# Patient Record
Sex: FEMALE | Race: OTHER
[De-identification: names, ages, dates, MRNs, and addresses within clinical notes are randomized per-mention and may not be internally consistent; named-entity substitution may affect disease eponyms.]

---

## 2019-08-10 ENCOUNTER — HOSPITAL ENCOUNTER (EMERGENCY)
Dept: HOSPITAL 62 - ER | Age: 29
Discharge: HOME | End: 2019-08-10
Payer: COMMERCIAL

## 2019-08-10 VITALS — DIASTOLIC BLOOD PRESSURE: 64 MMHG | SYSTOLIC BLOOD PRESSURE: 111 MMHG

## 2019-08-10 DIAGNOSIS — O46.91: Primary | ICD-10-CM

## 2019-08-10 DIAGNOSIS — Z3A.00: ICD-10-CM

## 2019-08-10 DIAGNOSIS — O26.891: ICD-10-CM

## 2019-08-10 DIAGNOSIS — R10.9: ICD-10-CM

## 2019-08-10 DIAGNOSIS — R10.30: ICD-10-CM

## 2019-08-10 LAB
ADD MANUAL DIFF: NO
ALBUMIN SERPL-MCNC: 3.9 G/DL (ref 3.5–5)
ALP SERPL-CCNC: 71 U/L (ref 38–126)
ANION GAP SERPL CALC-SCNC: 12 MMOL/L (ref 5–19)
APPEARANCE UR: (no result)
APTT PPP: YELLOW S
AST SERPL-CCNC: 25 U/L (ref 14–36)
BASOPHILS # BLD AUTO: 0 10^3/UL (ref 0–0.2)
BASOPHILS NFR BLD AUTO: 0.2 % (ref 0–2)
BILIRUB DIRECT SERPL-MCNC: 0.2 MG/DL (ref 0–0.4)
BILIRUB SERPL-MCNC: 0.2 MG/DL (ref 0.2–1.3)
BILIRUB UR QL STRIP: NEGATIVE
BUN SERPL-MCNC: 12 MG/DL (ref 7–20)
CALCIUM: 9.2 MG/DL (ref 8.4–10.2)
CHLORIDE SERPL-SCNC: 100 MMOL/L (ref 98–107)
CO2 SERPL-SCNC: 26 MMOL/L (ref 22–30)
EOSINOPHIL # BLD AUTO: 0.1 10^3/UL (ref 0–0.6)
EOSINOPHIL NFR BLD AUTO: 0.6 % (ref 0–6)
ERYTHROCYTE [DISTWIDTH] IN BLOOD BY AUTOMATED COUNT: 13.3 % (ref 11.5–14)
GLUCOSE SERPL-MCNC: 103 MG/DL (ref 75–110)
GLUCOSE UR STRIP-MCNC: NEGATIVE MG/DL
HCT VFR BLD CALC: 39.9 % (ref 36–47)
HGB BLD-MCNC: 13.6 G/DL (ref 12–15.5)
KETONES UR STRIP-MCNC: NEGATIVE MG/DL
LYMPHOCYTES # BLD AUTO: 1.9 10^3/UL (ref 0.5–4.7)
LYMPHOCYTES NFR BLD AUTO: 15.1 % (ref 13–45)
MCH RBC QN AUTO: 29.3 PG (ref 27–33.4)
MCHC RBC AUTO-ENTMCNC: 34 G/DL (ref 32–36)
MCV RBC AUTO: 86 FL (ref 80–97)
MONOCYTES # BLD AUTO: 0.6 10^3/UL (ref 0.1–1.4)
MONOCYTES NFR BLD AUTO: 5.2 % (ref 3–13)
NEUTROPHILS # BLD AUTO: 9.7 10^3/UL (ref 1.7–8.2)
NEUTS SEG NFR BLD AUTO: 78.9 % (ref 42–78)
NITRITE UR QL STRIP: NEGATIVE
PH UR STRIP: 6 [PH] (ref 5–9)
PLATELET # BLD: 293 10^3/UL (ref 150–450)
POTASSIUM SERPL-SCNC: 3.8 MMOL/L (ref 3.6–5)
PROT SERPL-MCNC: 6.7 G/DL (ref 6.3–8.2)
PROT UR STRIP-MCNC: NEGATIVE MG/DL
RBC # BLD AUTO: 4.63 10^6/UL (ref 3.72–5.28)
SP GR UR STRIP: 1.02
TOTAL CELLS COUNTED % (AUTO): 100 %
UROBILINOGEN UR-MCNC: 2 MG/DL (ref ?–2)
WBC # BLD AUTO: 12.2 10^3/UL (ref 4–10.5)

## 2019-08-10 PROCEDURE — 76817 TRANSVAGINAL US OBSTETRIC: CPT

## 2019-08-10 PROCEDURE — 84702 CHORIONIC GONADOTROPIN TEST: CPT

## 2019-08-10 PROCEDURE — 80053 COMPREHEN METABOLIC PANEL: CPT

## 2019-08-10 PROCEDURE — 85025 COMPLETE CBC W/AUTO DIFF WBC: CPT

## 2019-08-10 PROCEDURE — 81001 URINALYSIS AUTO W/SCOPE: CPT

## 2019-08-10 PROCEDURE — 36415 COLL VENOUS BLD VENIPUNCTURE: CPT

## 2019-08-10 PROCEDURE — 99284 EMERGENCY DEPT VISIT MOD MDM: CPT

## 2019-08-10 PROCEDURE — 93976 VASCULAR STUDY: CPT

## 2019-08-10 NOTE — ER DOCUMENT REPORT
ED General





- General


Chief Complaint: Vag Bleeding, +preg <12wks


Stated Complaint: ABDOMINAL PAIN


Primary Care Provider: 


JUD SMITH MD [Primary Care Provider] - Follow up as needed


ELIZABETH BO MD [LOC[facility] T[facility]ENS] - 08/12/19 9:30 am


Notes: 





Healthy 29-year-old G1, P0 pregnant female presents to the emergency department 

with chief complaint of vaginal bleeding.  Patient states she was visiting 

family is very when she went to the bathroom and started having some lower 

abdominal cramping and vaginal bleeding.  She noticed a little bit of blood in 

the toilet and then some blood when she wiped.  She states the cramps were 

comparable to menstrual cramps and they are intermittent.  Patient does not know

if she is currently bleeding at this time as she came directly over.  Patient 

denies the abdominal pain is unilateral in nature and says she will 

intermittently feel pain on either side of her lower abdomen.  No fevers or 

chills, no urinary symptoms, no abnormal vaginal discharge, no 

nausea/vomiting/diarrhea/constipation.


TRAVEL OUTSIDE OF THE [place] IN LAST 30 DAYS: No





- Related Data


Allergies/Adverse Reactions: 


                                        





No Known Allergies Allergy (Unverified 08/10/19 17:55)


   











Past Medical History





- Social History


Smoking Status: Never Smoker


Family History: None





Review of Systems





- Review of Systems


Constitutional: See HPI


EENT: No symptoms reported


Cardiovascular: No symptoms reported


Respiratory: No symptoms reported


Gastrointestinal: See HPI


Genitourinary: See HPI


Female Genitourinary: See HPI


Musculoskeletal: No symptoms reported


Skin: No symptoms reported


Hematologic/Lymphatic: No symptoms reported


Neurological/Psychological: No symptoms reported





Physical Exam





- Vital signs


Vitals: 


                                        











Temp Pulse Resp BP Pulse Ox


 


 98.5 F   88   21 H  124/70   100 


 


 08/10/19 17:40  08/10/19 17:40  08/10/19 17:40  08/10/19 17:40  08/10/19 17:40














- Notes


Notes: 





PHYSICAL EXAMINATION:





Reviewed vital signs and charting by RN





GENERAL: Alert, interacts well. No acute distress.


HEAD: Normocephalic, atraumatic.


EYES: Pupils equal and round. Extraocular movements intact.


ENT: Oral mucosa moist, tongue midline. 


NECK: Full range of motion. Trachea midline.


LUNGS: Clear to auscultation bilaterally, no wheezes, rales, or rhonchi. No 

respiratory distress.


HEART: Regular rate and rhythm. No murmur


ABDOMEN: soft, mild lower abdominal tenderness to palpation bilateral adnexal 

areas.  No distention. Bowel sounds present


EXTREMITIES: Moves all 4 extremities spontaneously. No edema,  No cyanosis.


PSYCH: Normal affect, normal mood.


SKIN: Warm, dry, normal turgor. No rashes or lesions noted.








Course





- Re-evaluation


Re-evalutation: 





08/10/19 17:54


Overall well-appearing.  Plan is to get blood work, serum hCG, transvaginal 

ultrasound.


08/10/19 17:54





08/10/19 19:07





                                        





Transvaginal US  08/10/19 17:51


IMPRESSION:  Single intrauterine gestation at sonographic gestational age of 6 

weeks, 6 days.  ABDON 3/29/2020.  Fetal heart rate 122 BPM.


Trimester of pregnancy: First trimester - 0 to 13 weeks.


 











08/10/19 19:58


Beta-hCG 39,000+.  There was a delay because it had to get diluted for lab to 

result.  Transvaginal ultrasound above.  Patient is stable for discharge at this

time.





- Vital Signs


Vital signs: 


                                        











Temp Pulse Resp BP Pulse Ox


 


 98.5 F   88   21 H  124/70   100 


 


 08/10/19 17:40  08/10/19 17:40  08/10/19 17:40  08/10/19 17:40  08/10/19 17:40














- Laboratory


Result Diagrams: 


                                 08/10/19 18:17





                                 08/10/19 18:17


Laboratory results interpreted by me: 


                                        











  08/10/19 08/10/19 08/10/19





  18:00 18:17 18:17


 


WBC   12.2 H 


 


Seg Neutrophils %   78.9 H 


 


Absolute Neutrophils   9.7 H 


 


Beta HCG, Quant    99146.00 H


 


Urine Blood  LARGE H  


 


Urine Urobilinogen  2.0 H  


 


Ur Leukocyte Esterase  TRACE H  














Discharge





- Discharge


Clinical Impression: 


 Vaginal bleeding affecting early pregnancy





Condition: Good


Disposition: HOME, SELF-CARE


Additional Instructions: 


Your ultrasound today shows a living intrauterine pregnancy.   Please follow 

closely with your primary care OB/GYN.  Please return if you develop severe 

abdominal pain, bleeding that goes through more than 2 pads for more than 2 

hours, pass out, or have any other symptoms that are concerning to you. Please 

follow-up closely with your OBGYN regarding todays visit.


Referrals: 


JUD SMITH MD [Primary Care Provider] - Follow up as needed


ELIZABETH BO MD [LOCUM TENENS] - 08/12/19 9:30 am

## 2019-08-10 NOTE — RADIOLOGY REPORT (SQ)
EXAM DESCRIPTION:  U/S OB TRANSVAG W/DOPPLER



COMPLETED DATE/TIME:  8/10/2019 6:16 pm



REASON FOR STUDY:  +pregnancy vaginal bleeding



COMPARISON:  None.



TECHNIQUE:  Transvaginal static and realtime grayscale images acquired of the pelvis. Additional karen
cted spectral and color Doppler images recorded. All images stored on PACs.

bHCG: Pending.

CLINICAL DATES:  6 weeks, 2 days



LIMITATIONS:  None.



FINDINGS:  FETUS:  Single Living intrauterine pregnancy.

ULTRASOUND EGA: 6 weeks, 6 days

ULTRASOUND ABDON: 3/29/2020

CRL:  0.8 cm

FHR: 122  beats per minute.

UTERUS: No masses. No anomalies.

CERVICAL LENGTH: 3.2 cm   Closed.

RIGHT ADNEXA: Normal ovary with normal vascular flow.  Probable corpus luteum.

No adnexal free fluid.

No adnexal masses.

LEFT ADNEXA: Normal ovary with normal vascular flow.  Multiple small follicles.

No adnexal free fluid.

No adnexal masses.

FREE FLUID: None.

OTHER: No other significant finding.



IMPRESSION:  Single intrauterine gestation at sonographic gestational age of 6 weeks, 6 days.  ABDON 3/
29/2020.  Fetal heart rate 122 BPM.

Trimester of pregnancy: First trimester - 0 to 13 weeks.



TECHNICAL DOCUMENTATION:  JOB ID:  [id number]

 2011 [employer]- All Rights Reserved                            rev-5/18



Reading location - IP/workstation name: [email or web address]

## 2020-10-07 ENCOUNTER — HOSPITAL ENCOUNTER (EMERGENCY)
Dept: HOSPITAL 62 - ER | Age: 30
Discharge: HOME | End: 2020-10-07
Payer: SELF-PAY

## 2020-10-07 VITALS — SYSTOLIC BLOOD PRESSURE: 108 MMHG | DIASTOLIC BLOOD PRESSURE: 60 MMHG

## 2020-10-07 DIAGNOSIS — R11.0: ICD-10-CM

## 2020-10-07 DIAGNOSIS — K80.20: Primary | ICD-10-CM

## 2020-10-07 DIAGNOSIS — R10.11: ICD-10-CM

## 2020-10-07 LAB
ADD MANUAL DIFF: NO
ALBUMIN SERPL-MCNC: 4.2 G/DL (ref 3.5–5)
ALP SERPL-CCNC: 70 U/L (ref 38–126)
ANION GAP SERPL CALC-SCNC: 8 MMOL/L (ref 5–19)
APPEARANCE UR: CLEAR
APTT PPP: YELLOW S
AST SERPL-CCNC: 39 U/L (ref 14–36)
BASOPHILS # BLD AUTO: 0 10^3/UL (ref 0–0.2)
BASOPHILS NFR BLD AUTO: 0.3 % (ref 0–2)
BILIRUB DIRECT SERPL-MCNC: 0.3 MG/DL (ref 0–0.4)
BILIRUB SERPL-MCNC: 0.4 MG/DL (ref 0.2–1.3)
BILIRUB UR QL STRIP: NEGATIVE
BUN SERPL-MCNC: 13 MG/DL (ref 7–20)
CALCIUM: 9 MG/DL (ref 8.4–10.2)
CHLORIDE SERPL-SCNC: 102 MMOL/L (ref 98–107)
CO2 SERPL-SCNC: 27 MMOL/L (ref 22–30)
EOSINOPHIL # BLD AUTO: 0.1 10^3/UL (ref 0–0.6)
EOSINOPHIL NFR BLD AUTO: 0.9 % (ref 0–6)
ERYTHROCYTE [DISTWIDTH] IN BLOOD BY AUTOMATED COUNT: 13.5 % (ref 11.5–14)
GLUCOSE SERPL-MCNC: 110 MG/DL (ref 75–110)
GLUCOSE UR STRIP-MCNC: NEGATIVE MG/DL
HCT VFR BLD CALC: 40.6 % (ref 36–47)
HGB BLD-MCNC: 14 G/DL (ref 12–15.5)
KETONES UR STRIP-MCNC: NEGATIVE MG/DL
LYMPHOCYTES # BLD AUTO: 2.8 10^3/UL (ref 0.5–4.7)
LYMPHOCYTES NFR BLD AUTO: 22.2 % (ref 13–45)
MCH RBC QN AUTO: 30.2 PG (ref 27–33.4)
MCHC RBC AUTO-ENTMCNC: 34.5 G/DL (ref 32–36)
MCV RBC AUTO: 88 FL (ref 80–97)
MONOCYTES # BLD AUTO: 0.6 10^3/UL (ref 0.1–1.4)
MONOCYTES NFR BLD AUTO: 5.1 % (ref 3–13)
NEUTROPHILS # BLD AUTO: 8.8 10^3/UL (ref 1.7–8.2)
NEUTS SEG NFR BLD AUTO: 71.5 % (ref 42–78)
PH UR STRIP: 7 [PH] (ref 5–9)
PLATELET # BLD: 285 10^3/UL (ref 150–450)
POTASSIUM SERPL-SCNC: 3.9 MMOL/L (ref 3.6–5)
PROT SERPL-MCNC: 6.9 G/DL (ref 6.3–8.2)
PROT UR STRIP-MCNC: NEGATIVE MG/DL
RBC # BLD AUTO: 4.63 10^6/UL (ref 3.72–5.28)
SP GR UR STRIP: 1.02
TOTAL CELLS COUNTED % (AUTO): 100 %
UROBILINOGEN UR-MCNC: NEGATIVE MG/DL (ref ?–2)
WBC # BLD AUTO: 12.4 10^3/UL (ref 4–10.5)

## 2020-10-07 PROCEDURE — 99284 EMERGENCY DEPT VISIT MOD MDM: CPT

## 2020-10-07 PROCEDURE — 81025 URINE PREGNANCY TEST: CPT

## 2020-10-07 PROCEDURE — S0119 ONDANSETRON 4 MG: HCPCS

## 2020-10-07 PROCEDURE — 36415 COLL VENOUS BLD VENIPUNCTURE: CPT

## 2020-10-07 PROCEDURE — 76705 ECHO EXAM OF ABDOMEN: CPT

## 2020-10-07 PROCEDURE — 83690 ASSAY OF LIPASE: CPT

## 2020-10-07 PROCEDURE — 80053 COMPREHEN METABOLIC PANEL: CPT

## 2020-10-07 PROCEDURE — 81001 URINALYSIS AUTO W/SCOPE: CPT

## 2020-10-07 PROCEDURE — 85025 COMPLETE CBC W/AUTO DIFF WBC: CPT

## 2020-10-07 NOTE — ER DOCUMENT REPORT
ED Medical Screen (RME)





- General


Chief Complaint: Abdominal Pain


Stated Complaint: ABDOMINAL PAIN


Primary Care Provider: 


JUD SMITH MD [Primary Care Provider] - Follow up as needed


Notes: 





Patient is a 30-year-old female who status post appendectomy who presents to the

emergency department the chief complaint of abdominal pain  recently.  States 

the pain is made worse with any oral intake of anything greasy.  Reports 

associated nausea and vomiting especially after oral intake of food stuffs.  

Denies any associated diarrhea.  States the pain is mostly epigastric to right 

upper and left upper quadrants.  States the pain occasionally radiates through 

to the right back but rare.  Denies any specific palliative factors.  No known 

fevers.  No recent travel or known sick contacts.





I have treated and performed a rapid initial assessment of this patient.  A 

comprehensive ED assessment and evaluation of the patient, analysis of test 

results and completion of medical decision making process will be conducted by 

additional ED providers.





PHYSICAL EXAMINATION:





GENERAL: Well-appearing, well-nourished and in no acute distress.  A&Ox4.  

Answers questions appropriately.


TRAVEL OUTSIDE OF THE [place] IN LAST 30 DAYS: No





- Related Data


Allergies/Adverse Reactions: 


                                        





No Known Allergies Allergy (Unverified 08/10/19 17:55)


   











Past Medical History


Renal/ Medical History: Denies: Hx Peritoneal Dialysis





Physical Exam





- Vital signs


Vitals: 





                                        











Temp Pulse Resp BP Pulse Ox


 


 98.3 F   85   16   135/90 H  100 


 


 10/07/20 15:15  10/07/20 15:15  10/07/20 15:15  10/07/20 15:15  10/07/20 15:15














Course





- Vital Signs


Vital signs: 





                                        











Temp Pulse Resp BP Pulse Ox


 


 98.3 F   85   16   135/90 H  100 


 


 10/07/20 15:15  10/07/20 15:15  10/07/20 15:15  10/07/20 15:15  10/07/20 15:15














Doctor's Discharge





- Discharge


Referrals: 


JUD SMITH MD [Primary Care Provider] - Follow up as needed

## 2020-10-07 NOTE — ER DOCUMENT REPORT
ED General





- General


Chief Complaint: Abdominal Pain


Stated Complaint: ABDOMINAL PAIN


Time Seen by Provider: 10/07/20 16:55


Primary Care Provider: 


JUD SMITH MD [Primary Care Provider] - Follow up as needed


TRAVEL OUTSIDE OF THE [place] IN LAST 30 DAYS: No





- HPI


Notes: 





Chief complaint: Right upper quadrant abdominal pain





History of present illness: Previously healthy 30-year-old female  1 AB 1

with prior history of appendectomy comes in with intermittent dull discomfort 

right upper quadrant of abdomen radiating through to back for the last 1 months.

 Occasional mild nausea without vomiting.  No fever chills.  No dysuria.





Last menses 2 weeks ago described as normal.





No allergies.  No chronic medications.  No PMD.  Works as a .





- Related Data


Allergies/Adverse Reactions: 


                                        





No Known Allergies Allergy (Verified 10/07/20 16:28)


   











Past Medical History





- General


Information source: Patient, [facility] Records





- Social History


Smoking Status: Never Smoker


Frequency of alcohol use: None


Drug Abuse: None


Occupation: 


Lives with: Friend


Family History: None


Renal/ Medical History: Denies: Hx Peritoneal Dialysis


Past Surgical History: Reports: Hx Appendectomy





Review of Systems





- Review of Systems


Notes: 





Constitutional: Negative for fever.


HENT: Negative for sore throat.


Eyes: Negative for visual changes.


Cardiovascular: Negative for chest pain.


Respiratory: Negative for shortness of breath.


Gastrointestinal: As per HPI.


Genitourinary: Negative for dysuria.


Musculoskeletal: Negative for back pain.


Skin: Negative for rash.


Neurological: Negative for headaches, weakness or numbness.





10 point ROS negative except as marked above and in HPI.








Physical Exam





- Vital signs


Vitals: 





                                        











Temp Pulse Resp BP Pulse Ox


 


 98.3 F   85   16   135/90 H  100 


 


 10/07/20 15:15  10/07/20 15:15  10/07/20 15:15  10/07/20 15:15  10/07/20 15:15














- Notes


Notes: 











GENERAL:  female approximately stated age appearing in no acute 

distress.





SKIN: Good turgor no rashes.





HEAD: Normocephalic atraumatic.





EYES: PERRLA.  EOMI.  Conjunctivae and sclerae clear.





EARS: CANALS AND TMS CLEAR.





NOSE: CLEAR.





MOUTH: Moist mucosa.  Good dentition.  No stridor or edema.  No drooling.





NECK: Supple.  No masses or thyromegaly.  No adenopathy.  Carotids 2+ without 

bruits.  No JVD.





BACK: Symmetrical without tenderness.





CHEST: Respirations unlabored.  Breath sounds clear and symmetrical.





HEART: Regular rhythm.  No murmur gallop or rub.





ABDOMEN: Soft nontender without masses, organomegaly or rebound.  Bowel sounds 

normally active.  No bruits.





GENITALIA: Deferred.





EXTREMITIES: No edema.  No calf tenderness.  Cap refill less than 1.5 seconds.  

Dorsalis pedis and posterior tibial pulses 3+ and symmetrical.





NEUROLOGICAL: GCS 15.  Alert and oriented x3.  Normal gait.  Fluent speech.  

Cranial nerves II through XII intact.  Sensorimotor and cerebellar normal.  

Normal tone.





PSYCHIATRIC: Appropriate affect.





Course





- Re-evaluation


Re-evalutation: 





10/07/20 17:14


Normal urinalysis.  Pregnancy test negative.  CBC and comprehensive metabolic 

profile are normal.  Gallbladder ultrasound shows multiple small gallstones with

no wall thickening and no ductal dilatation.  There is no pericholecystic fluid 

and sonographic Alvarenga sign is negative.





Patient is reassured.  Advised Tylenol as needed.  I will give her a 

prescription for Zofran for as needed use.  Outpatient surgery referral.  

Reviewed red flag indications for urgent return to the emergency department.





Findings, clinical impression and plan of treatment have been discussed with 

patient/family.  Understanding of current findings and recommendations has been 

acknowledged by them and there is agreement regarding disposition and follow-up.





- Vital Signs


Vital signs: 





                                        











Temp Pulse Resp BP Pulse Ox


 


 98.3 F   85   16   135/90 H  100 


 


 10/07/20 15:15  10/07/20 15:15  10/07/20 15:15  10/07/20 15:15  10/07/20 15:15














- Laboratory


Result Diagrams: 


                                 10/07/20 15:34





                                 10/07/20 15:34


Laboratory results interpreted by me: 





                                        











  10/07/20 10/07/20 10/07/20





  15:34 15:34 15:34


 


WBC  12.4 H  


 


Absolute Neuts (auto)  8.8 H  


 


Sodium   136.8 L 


 


AST   39 H 


 


Urine Blood    SMALL H














Discharge





- Discharge


Clinical Impression: 


Cholelithiasis


Qualifiers:


 Cholelithiasis location: gallbladder Cholecystitis presence: without 

cholecystitis Biliary obstruction: without biliary obstruction Qualified Code

(s): K80.20 - Calculus of gallbladder without cholecystitis without obstruction





Condition: Stable


Disposition: HOME, SELF-CARE


Additional Instructions: 


Gallbladder Disease





     Your evaluation shows evidence of gallbladder disease.  The gallbladder is 

a pouch under the liver which stores bile.  Stones, infection, or irritation of 

the gallbladder cause attacks of pain. Certain foods -- fats in particular -- 

may provoke attacks.


     The usual treatment for gallbladder disease is surgical removal of the gal

lbladder -- called a cholecystectomy.  You will be referred to a physician 

qualified to advise you on the best treatment for your problem.


     Hospitalization is not necessary.  Take clear liquids only until you are 

painfree.  After that, you should stay on a low-fat diet, with frequent SMALL 

meals.


     Call the doctor or return at once if you develop severe pain, repeated 

vomiting, fever, or jaundice (a yellow color in the skin and whites of the 

eyes).








Tylenol as needed.





Zofran as needed for nausea.





Follow-up with referral surgeon in the clinic to continue to have symptoms.


Prescriptions: 


Ondansetron [Zofran Odt 4 mg Tablet] 1 - 2 tab PO Q4H PRN #15 tab.rapdis


 PRN Reason: For Nausea/Vomiting


Referrals: 


JUD SMITH MD [Primary Care Provider] - Follow up as needed


SHRUTI BUI MD [ACTIVE STAFF] - Follow up as needed

## 2020-10-07 NOTE — RADIOLOGY REPORT (SQ)
EXAM DESCRIPTION:  U/S ABDOMEN LIMITED W/O DOP



IMAGES COMPLETED DATE/TIME:  10/7/2020 4:10 pm



REASON FOR STUDY:  abd pain n/v



COMPARISON:  None.



TECHNIQUE:  Dynamic and static grayscale images acquired of the abdomen and recorded on PACS. Additio
nal selected color Doppler and spectral images recorded.



LIMITATIONS:  None.



FINDINGS:  PANCREAS: No masses.  Visualized pancreatic duct normal caliber.

LIVER: There is a 15 mm echogenic area in the right lobe of the liver suggesting a hemangioma.  Karrie
l echogenicity in the liver generally.

LIVER VASCULATURE: Normal directional flow of the main portal vein and hepatic veins.

GALLBLADDER: Gallstones are present.  There is no wall thickening.

ULTRASOUND-DETECTED CURRY'S SIGN: Negative.

INTRAHEPATIC DUCTS AND COMMON DUCT: Common bile duct is borderline at 7 mm.  There appears to be mild
 intrahepatic ductal dilatation.

AORTA: No aneurysm.

RIGHT KIDNEY:  Normal size, 9.6 cm. Normal echogenicity. No solid or suspicious masses. No hydronephr
osis. No calcifications.

PERITONEAL AND RIGHT PLEURAL SPACE: No ascites or effusions.

OTHER: No other significant findings.



IMPRESSION:  Small hepatic hemangioma.  Cholelithiasis with no evidence of cholecystitis.  Borderline
 common bile ducts with mild intrahepatic ductal dilatation.  Correlate for biliary obstruction.



TECHNICAL DOCUMENTATION:  JOB ID:  [id number]

 2011 [employer]- All Rights Reserved



Reading location - IP/workstation name: [email or web address]